# Patient Record
Sex: FEMALE | Race: WHITE
[De-identification: names, ages, dates, MRNs, and addresses within clinical notes are randomized per-mention and may not be internally consistent; named-entity substitution may affect disease eponyms.]

---

## 2021-02-05 ENCOUNTER — NURSE TRIAGE (OUTPATIENT)
Dept: OTHER | Facility: CLINIC | Age: 56
End: 2021-02-05

## 2021-02-05 NOTE — TELEPHONE ENCOUNTER
Reason for Disposition   Hand or wrist pain    Answer Assessment - Initial Assessment Questions  1. ONSET: \"When did the pain start? \"      Patient had a heart cath today and is calling about the insertion site  2. LOCATION: \"Where is the pain located? \"      Right wrist  3. PAIN: \"How bad is the pain? \" (Scale 1-10; or mild, moderate, severe)    - MILD (1-3): doesn't interfere with normal activities    - MODERATE (4-7): interferes with normal activities (e.g., work or school) or awakens from sleep    - SEVERE (8-10): excruciating pain, unable to use hand at all      Mild burning at insertion site  4. WORK OR EXERCISE: \"Has there been any recent work or exercise that involved this part of the body? \"      no  5. CAUSE: \"What do you think is causing the pain? \"      Heart cath site  6. AGGRAVATING FACTORS: \"What makes the pain worse? \" (e.g., using computer)      no  7. OTHER SYMPTOMS: \"Do you have any other symptoms? \" (e.g., neck pain, swelling, rash, numbness, fever)      Fingers are slightly swelled, no fever, no numbness  8. PREGNANCY: \"Is there any chance you are pregnant? \" \"When was your last menstrual period? \"      No    Protocols used: HAND AND WRIST PAIN-ADULT-AH    Patient had a heart cath today. Insertion site is the right wrist.  Patient is experiencing slight burring at insertion site. She admits to mild swelling of the fingers. Encouraged patient to use some ice to reduce pain and swelling as well as elevation. Provided care advice. Recommended home care. Encouraged patient to call back with fever, increasing swelling/redness, or bleeding.